# Patient Record
Sex: FEMALE | Race: WHITE | URBAN - METROPOLITAN AREA
[De-identification: names, ages, dates, MRNs, and addresses within clinical notes are randomized per-mention and may not be internally consistent; named-entity substitution may affect disease eponyms.]

---

## 2017-10-12 ENCOUNTER — OFFICE VISIT (OUTPATIENT)
Dept: PEDIATRICS | Facility: CLINIC | Age: 27
End: 2017-10-12
Payer: COMMERCIAL

## 2017-10-12 VITALS
HEIGHT: 64 IN | DIASTOLIC BLOOD PRESSURE: 50 MMHG | HEART RATE: 62 BPM | WEIGHT: 131.3 LBS | BODY MASS INDEX: 22.42 KG/M2 | OXYGEN SATURATION: 98 % | SYSTOLIC BLOOD PRESSURE: 100 MMHG | TEMPERATURE: 97.8 F

## 2017-10-12 DIAGNOSIS — R76.8 ELEVATED ANTINUCLEAR ANTIBODY (ANA) LEVEL: ICD-10-CM

## 2017-10-12 DIAGNOSIS — K31.9 DYSPEPSIA AND DISORDER OF FUNCTION OF STOMACH: ICD-10-CM

## 2017-10-12 DIAGNOSIS — M25.50 MULTIPLE JOINT PAIN: ICD-10-CM

## 2017-10-12 DIAGNOSIS — R79.89 ABNORMAL TSH: Primary | ICD-10-CM

## 2017-10-12 DIAGNOSIS — R10.13 DYSPEPSIA AND DISORDER OF FUNCTION OF STOMACH: ICD-10-CM

## 2017-10-12 LAB
ALBUMIN SERPL-MCNC: 3.7 G/DL (ref 3.4–5)
ALP SERPL-CCNC: 47 U/L (ref 40–150)
ALT SERPL W P-5'-P-CCNC: 27 U/L (ref 0–50)
ANION GAP SERPL CALCULATED.3IONS-SCNC: 4 MMOL/L (ref 3–14)
AST SERPL W P-5'-P-CCNC: 23 U/L (ref 0–45)
BILIRUB SERPL-MCNC: 0.5 MG/DL (ref 0.2–1.3)
BUN SERPL-MCNC: 10 MG/DL (ref 7–30)
CALCIUM SERPL-MCNC: 9.2 MG/DL (ref 8.5–10.1)
CHLORIDE SERPL-SCNC: 106 MMOL/L (ref 94–109)
CK SERPL-CCNC: 130 U/L (ref 30–225)
CO2 SERPL-SCNC: 27 MMOL/L (ref 20–32)
CREAT SERPL-MCNC: 0.89 MG/DL (ref 0.52–1.04)
CRP SERPL-MCNC: 4.2 MG/L (ref 0–8)
ERYTHROCYTE [SEDIMENTATION RATE] IN BLOOD BY WESTERGREN METHOD: 6 MM/H (ref 0–20)
GFR SERPL CREATININE-BSD FRML MDRD: 76 ML/MIN/1.7M2
GLUCOSE SERPL-MCNC: 95 MG/DL (ref 70–99)
POTASSIUM SERPL-SCNC: 3.8 MMOL/L (ref 3.4–5.3)
PROT SERPL-MCNC: 7.4 G/DL (ref 6.8–8.8)
SODIUM SERPL-SCNC: 137 MMOL/L (ref 133–144)
T4 FREE SERPL-MCNC: 1.13 NG/DL (ref 0.76–1.46)
TSH SERPL DL<=0.005 MIU/L-ACNC: 3.66 MU/L (ref 0.4–4)

## 2017-10-12 PROCEDURE — 82550 ASSAY OF CK (CPK): CPT | Performed by: NURSE PRACTITIONER

## 2017-10-12 PROCEDURE — 86140 C-REACTIVE PROTEIN: CPT | Performed by: NURSE PRACTITIONER

## 2017-10-12 PROCEDURE — 85652 RBC SED RATE AUTOMATED: CPT | Performed by: NURSE PRACTITIONER

## 2017-10-12 PROCEDURE — 86038 ANTINUCLEAR ANTIBODIES: CPT | Performed by: NURSE PRACTITIONER

## 2017-10-12 PROCEDURE — 86431 RHEUMATOID FACTOR QUANT: CPT | Performed by: NURSE PRACTITIONER

## 2017-10-12 PROCEDURE — 99203 OFFICE O/P NEW LOW 30 MIN: CPT | Performed by: NURSE PRACTITIONER

## 2017-10-12 PROCEDURE — 86376 MICROSOMAL ANTIBODY EACH: CPT | Performed by: NURSE PRACTITIONER

## 2017-10-12 PROCEDURE — 80053 COMPREHEN METABOLIC PANEL: CPT | Performed by: NURSE PRACTITIONER

## 2017-10-12 PROCEDURE — 36415 COLL VENOUS BLD VENIPUNCTURE: CPT | Performed by: NURSE PRACTITIONER

## 2017-10-12 PROCEDURE — 84443 ASSAY THYROID STIM HORMONE: CPT | Performed by: NURSE PRACTITIONER

## 2017-10-12 PROCEDURE — 86039 ANTINUCLEAR ANTIBODIES (ANA): CPT | Performed by: NURSE PRACTITIONER

## 2017-10-12 PROCEDURE — 84439 ASSAY OF FREE THYROXINE: CPT | Performed by: NURSE PRACTITIONER

## 2017-10-12 RX ORDER — DROSPIRENONE AND ETHINYL ESTRADIOL 0.02-3(28)
1 KIT ORAL DAILY
Refills: 12 | COMMUNITY
Start: 2017-07-23

## 2017-10-12 RX ORDER — LEVALBUTEROL TARTRATE 45 UG/1
1 AEROSOL, METERED ORAL PRN
Refills: 1 | COMMUNITY
Start: 2016-12-21

## 2017-10-12 NOTE — PATIENT INSTRUCTIONS
PLAN:   1.  Orders Placed This Encounter   Medications     GIANVI 3-0.02 MG per tablet     Sig: Take 1 tablet by mouth daily     Refill:  12     XOPENEX HFA 45 MCG/ACT Inhaler     Sig: Inhale 1 puff into the lungs as needed     Refill:  1     Orders Placed This Encounter   Procedures     TSH     T4 free     Thyroid peroxidase antibody     Rheumatoid factor     Anti Nuclear Margo IgG by IFA with Reflex     CK total     CRP inflammation     Erythrocyte sedimentation rate auto     Comprehensive metabolic panel       2. Patient needs to follow up in if no improvement,or sooner if worsening of symptoms or other symptoms develop.  Will follow up and/or notify patient of  results via My Chart to determine further need for followup      It was a pleasure seeing you today at the [facility]. Thank you for allowing us to care for you today. We truly hope we provided you with the excellent service you deserve. Please let us know if there is anything else we can do for you so we can be sure you are leaving completley satisfied with your care experience.       General information about your clinic   Clinic Hours Lab Hours (Appointments are required)   Mon-Thurs: 7:30 AM - 7 PM Mon-Thurs: 7:30 AM - 7 PM   Fri: 7:30 AM - 5 PM Fri: 7:30 AM - 5 PM        After Hours Nurse Advise & Appts:  [facility] Nurse Advisors: [phone]  Carmen On Call: to make appointments anytime: [phone] On Call Physician: call [phone] and answering service will page the on call physician.        For urgent appointments, please call [phone] and ask for the triage nurse or your care team clinic nurse.  How to contact my care team:  MyChart: [email or web address]   Phone: [phone]   Fax: [phone]       [facility]:   Phone: [phone]  Hours: 8:00 AM - 6:00 PM  Medication Refills:  Call your pharmacy and they will forward the refill to us. Please allow 3 business days for your refills to be  completed.       Normal or non-critical lab and imaging results will be communicated to you by MyChart, letter or phone within 7 days.  If you do not hear from us within 10 days, please call the clinic. If you have a critical or abnormal lab result, we will notify you by phone as soon as possible.       We now have PWIC (Pediatric Walk in Care)  Monday-Friday from 7:30-4. Simply walk in and be seen for your urgent needs like cough, fever, rash, diarrhea or vomiting, pink eye, UTI. No appointments needed. Ask one of the team for more information      -Your Care Team:    Dr. Brain Rubalcava - Internal Medicine/Pediatrics   Dr. Tanner Rodríguez - Family Medicine  Dr. Nara gAuilera - Pediatrics  Beatriz Blankenship CNP - Family Practice Nurse Practitioner  Dr. Edie Jacinto - Pediatrics

## 2017-10-12 NOTE — PROGRESS NOTES
SUBJECTIVE:   Louise Cuba is a 27 year old female who presents to clinic today for the following health issues:    New Patient/Transfer of Care    1. Needs a referral for endocrinologist, was seen at [facility] in the past, thyroid labs were elevated. Patient was seeing partner in peds in the past.  Is here on recommendation of [facility] after having workup there for chronic stomach issues.  In August had what was thought to be food poisoning   Had severe diarrhea for week with nausea and abdominal pain  Had one night of fever  Was seen at [facility] first   Is getting [facility] and they are looking at gastric emptying studies but on hold until after looking at thyroid as elevated at [facility] at about 6.94    Problem list and histories reviewed & adjusted, as indicated.  Additional history: as documented    There is no problem list on file for this patient.    History reviewed. No pertinent surgical history.    Social History   Substance Use Topics     Smoking status: Never Smoker     Smokeless tobacco: Never Used     Alcohol use Yes      Comment: socially     Family History   Problem Relation Age of Onset     DIABETES Maternal Grandmother      CEREBROVASCULAR DISEASE Maternal Grandmother      Other Cancer Maternal Grandfather      leukemia     Colon Cancer Paternal Grandmother      Other Cancer Paternal Grandfather      Coronary Artery Disease No family hx of      Hypertension No family hx of      Hyperlipidemia No family hx of      Breast Cancer No family hx of      Prostate Cancer No family hx of      Depression No family hx of      Anxiety Disorder No family hx of      MENTAL ILLNESS No family hx of      Substance Abuse No family hx of      Anesthesia Reaction No family hx of      Asthma No family hx of      OSTEOPOROSIS No family hx of      Genetic Disorder No family hx of      Thyroid Disease No family hx of      Obesity No family hx of      Unknown/Adopted No family hx of          Current Outpatient  "Prescriptions   Medication Sig Dispense Refill     GIANVI 3-0.02 MG per tablet Take 1 tablet by mouth daily  12     XOPENEX HFA 45 MCG/ACT Inhaler Inhale 1 puff into the lungs as needed  1     No Known Allergies  Labs reviewed in EPIC      Reviewed and updated as needed this visit by clinical staffTobacco  Allergies  Meds  Med Hx  Surg Hx  Fam Hx  Soc Hx      Reviewed and updated as needed this visit by Provider         ROS:  CONSTITUTIONAL:NEGATIVE for fever, chills. Some mild weight gain in the last 6 months  RESP:NEGATIVE for significant cough or SOB  CV: NEGATIVE for chest pain, palpitations or peripheral edema  GI: POSITIVE for constipation, diarrhea and dyspepsia and NEGATIVE for nausea, vomiting and weight loss  MUSCULOSKELETAL: POSITIVE  for joint pain. Mother has rheumatoid arthritis. Does have some morning stiffness. Joints that usually bother her are her hands and knees. Sometimes her wrists will hurt periodically   and NEGATIVE for joint swelling  and joint warmth   ENDOCRINE: POSITIVE  for weight gain and abnormal TSH  and NEGATIVE for polydipsia, polyphagia and polyuria  PSYCHIATRIC: POSITIVE for some mild anxiety but never had to be treated  and NEGATIVE forthoughts of hurting someone else and thoughts of self harm    OBJECTIVE:     /50 (BP Location: Right arm, Patient Position: Sitting, Cuff Size: Adult Regular)  Pulse 62  Temp 97.8  F (36.6  C) (Temporal)  Ht 5' 3.75\" (1.619 m)  Wt 131 lb 4.8 oz (59.6 kg)  LMP  (LMP Unknown)  SpO2 98%  Breastfeeding? No  BMI 22.71 kg/m2  Body mass index is 22.71 kg/(m^2).  GENERAL: healthy, alert and no distress  HENT: ear canals and TM's normal, nose and mouth without ulcers or lesions  NECK: no adenopathy and thyroid normal to palpation  RESP: lungs clear to auscultation - no rales, rhonchi or wheezes  CV: regular rates and rhythm, no murmur, click or rub, peripheral pulses strong and no peripheral edema  MS: no gross musculoskeletal defects " noted, no edema  NEURO: Normal strength and tone, mentation intact and speech normal  PSYCH: mentation appears normal, affect normal/bright    Diagnostic Test Results:  Results for orders placed or performed in visit on 10/12/17   TSH   Result Value Ref Range    TSH 3.66 0.40 - 4.00 mU/L   T4 free   Result Value Ref Range    T4 Free 1.13 0.76 - 1.46 ng/dL   Thyroid peroxidase antibody   Result Value Ref Range    Thyroid Peroxidase Antibody <10 <35 IU/mL   Rheumatoid factor   Result Value Ref Range    Rheumatoid Factor <20 <20 IU/mL   Anti Nuclear Margo IgG by IFA with Reflex   Result Value Ref Range    CHI interpretation Borderline Positive (A) NEG^Negative    CHI pattern 1 SPECKLED     CHI titer 1 1:80    CK total   Result Value Ref Range    CK Total 130 30 - 225 U/L   CRP inflammation   Result Value Ref Range    CRP Inflammation 4.2 0.0 - 8.0 mg/L   Erythrocyte sedimentation rate auto   Result Value Ref Range    Sed Rate 6 0 - 20 mm/h   Comprehensive metabolic panel   Result Value Ref Range    Sodium 137 133 - 144 mmol/L    Potassium 3.8 3.4 - 5.3 mmol/L    Chloride 106 94 - 109 mmol/L    Carbon Dioxide 27 20 - 32 mmol/L    Anion Gap 4 3 - 14 mmol/L    Glucose 95 70 - 99 mg/dL    Urea Nitrogen 10 7 - 30 mg/dL    Creatinine 0.89 0.52 - 1.04 mg/dL    GFR Estimate 76 >60 mL/min/1.7m2    GFR Estimate If Black >90 >60 mL/min/1.7m2    Calcium 9.2 8.5 - 10.1 mg/dL    Bilirubin Total 0.5 0.2 - 1.3 mg/dL    Albumin 3.7 3.4 - 5.0 g/dL    Protein Total 7.4 6.8 - 8.8 g/dL    Alkaline Phosphatase 47 40 - 150 U/L    ALT 27 0 - 50 U/L    AST 23 0 - 45 U/L       ASSESSMENT/PLAN:       Louise was seen today for thyroid disease.    Diagnoses and all orders for this visit:    Abnormal TSH  -     TSH  -     T4 free  -     Thyroid peroxidase antibody    Multiple joint pain  -     Rheumatoid factor  -     Anti Nuclear Margo IgG by IFA with Reflex  -     CK total  -     CRP inflammation  -     Erythrocyte sedimentation rate auto  -      RHEUMATOLOGY REFERRAL    Dyspepsia and disorder of function of stomach  -     Comprehensive metabolic panel    Elevated antinuclear antibody (CHI) level  -     RHEUMATOLOGY REFERRAL    PLAN:    Patient needs to follow up in if no improvement,or sooner if worsening of symptoms or other symptoms develop.  Will follow up and/or notify patient of  results via My Chart to determine further need for followup        See Patient Instructions    ANGIE Lilly [facility]

## 2017-10-12 NOTE — PROGRESS NOTES
Chris Cuba,    Attached are your test results.  -Liver and gallbladder tests are normal. (ALT,AST, Alk phos, bilirubin), kidney function is normal (Cr, GFR), Sodium is normal, Potassium is normal, Calcium is normal, Glucose is normal (diabetes screening test).   -TSH (thyroid stimulating hormone) level is normal which indicates normal thyroid function.  However antibodies are still pending   Your inflammatory markers are normal so far    Please contact us if you have any questions.    Beatriz Blankenship, CNP

## 2017-10-12 NOTE — MR AVS SNAPSHOT
After Visit Summary   10/12/2017    Louise Cuba    MRN: [id number]           Patient Information     Date Of Birth          1990        Visit Information        Provider Department      10/12/2017 11:30 AM Beatriz Blankenship APRN CNP [facility]        Today's Diagnoses     Abnormal TSH    -  1    Multiple joint pain        Dyspepsia and disorder of function of stomach          Care Instructions    PLAN:   1.  Orders Placed This Encounter   Medications     GIANVI 3-0.02 MG per tablet     Sig: Take 1 tablet by mouth daily     Refill:  12     XOPENEX HFA 45 MCG/ACT Inhaler     Sig: Inhale 1 puff into the lungs as needed     Refill:  1     Orders Placed This Encounter   Procedures     TSH     T4 free     Thyroid peroxidase antibody     Rheumatoid factor     Anti Nuclear Margo IgG by IFA with Reflex     CK total     CRP inflammation     Erythrocyte sedimentation rate auto     Comprehensive metabolic panel       2. Patient needs to follow up in if no improvement,or sooner if worsening of symptoms or other symptoms develop.  Will follow up and/or notify patient of  results via My Chart to determine further need for followup      It was a pleasure seeing you today at the [facility]. Thank you for allowing us to care for you today. We truly hope we provided you with the excellent service you deserve. Please let us know if there is anything else we can do for you so we can be sure you are leaving completley satisfied with your care experience.       General information about your clinic   Clinic Hours Lab Hours (Appointments are required)   Mon-Thurs: 7:30 AM - 7 PM Mon-Thurs: 7:30 AM - 7 PM   Fri: 7:30 AM - 5 PM Fri: 7:30 AM - 5 PM        After Hours Nurse Advise & Appts:  [facility] Nurse Advisors: [phone]  Carmen On Call: to make appointments anytime: [phone] On Call Physician: call [phone] and answering service will page the on call  physician.        For urgent appointments, please call [phone] and ask for the triage nurse or your care team clinic nurse.  How to contact my care team:  Francesca: [email or web address]   Phone: [phone]   Fax: [phone]       [facility]:   Phone: [phone]  Hours: 8:00 AM - 6:00 PM  Medication Refills:  Call your pharmacy and they will forward the refill to us. Please allow 3 business days for your refills to be completed.       Normal or non-critical lab and imaging results will be communicated to you by MyChart, letter or phone within 7 days.  If you do not hear from us within 10 days, please call the clinic. If you have a critical or abnormal lab result, we will notify you by phone as soon as possible.       We now have PWIC (Pediatric Walk in Care)  Monday-Friday from 7:30-4. Simply walk in and be seen for your urgent needs like cough, fever, rash, diarrhea or vomiting, pink eye, UTI. No appointments needed. Ask one of the team for more information      -Your Care Team:    Dr. Brain Rubalcava - Internal Medicine/Pediatrics   Dr. Tanner Rodríguez - Family Medicine  Dr. Nara Aguilera - Pediatrics  Beatriz Blankenship CNP - Family Practice Nurse Practitioner  Dr. Edie Jacinto - Pediatrics                       Follow-ups after your visit        Who to contact     If you have questions or need follow up information about today's clinic visit or your schedule please contact [facility] directly at [phone].  Normal or non-critical lab and imaging results will be communicated to you by [employer], letter or phone within 4 business days after the clinic has received the results. If you do not hear from us within 7 days, please contact the clinic through Ener[employer] or phone. If you have a critical or abnormal lab result, we will notify you by phone as soon as possible.  Submit refill requests through [employer] or call your pharmacy and they will forward the refill request to us. Please allow 3  "business days for your refill to be completed.          Additional Information About Your Visit        A[employer]P [employer] Information     [employer] is an electronic gateway that provides easy, online access to your medical records. With [employer], you can request a clinic appointment, read your test results, renew a prescription or communicate with your care team.     To sign up for [employer] visit the website at [email or web address]   You will be asked to enter the access code listed below, as well as some personal information. Please follow the directions to create your username and password.     Your access code is: [email or web address]  Expires: 1/10/2018 12:22 PM     Your access code will  in 90 days. If you need help or a new code, please contact your [facility] or call [phone] for assistance.        Care EveryWhere ID     This is your Care EveryWhere ID. This could be used by other organizations to access your [facility] medical records  [id number]        Your Vitals Were     Pulse Temperature Height Last Period Pulse Oximetry Breastfeeding?    62 97.8  F (36.6  C) (Temporal) 5' 3.75\" (1.619 m) (LMP Unknown) 98% No    BMI (Body Mass Index)                   22.71 kg/m2            Blood Pressure from Last 3 Encounters:   10/12/17 100/50    Weight from Last 3 Encounters:   10/12/17 131 lb 4.8 oz (59.6 kg)              We Performed the Following     Anti Nuclear Margo IgG by IFA with Reflex     CK total     Comprehensive metabolic panel     CRP inflammation     Erythrocyte sedimentation rate auto     Rheumatoid factor     T4 free     Thyroid peroxidase antibody     TSH        Primary Care Provider    None Specified       No primary provider on file.        Equal Access to Services     FRANCISCA STEPHENS : Paulino Salazar, kristen justice, elmer sharma. So [facility] [phone].    ATENCIÓN: Si habla español, tiene a yin disposición " servicios gratuitos de asistencia lingüística. Rosemary velazco [phone].    We comply with applicable federal civil rights laws and [place] laws. We do not discriminate on the basis of race, color, national origin, age, disability, sex, sexual orientation, or gender identity.            Thank you!     Thank you for choosing [facility]  for your care. Our goal is always to provide you with excellent care. Hearing back from our patients is one way we can continue to improve our services. Please take a few minutes to complete the written survey that you may receive in the mail after your visit with us. Thank you!             Your Updated Medication List - Protect others around you: Learn how to safely use, store and throw away your medicines at www.disposemymeds.org.          This list is accurate as of: 10/12/17 12:23 PM.  Always use your most recent med list.                   Brand Name Dispense Instructions for use Diagnosis    GIANVI 3-0.02 MG per tablet   Generic drug:  drospirenone-ethinyl estradiol      Take 1 tablet by mouth daily        XOPENEX HFA 45 MCG/ACT Inhaler   Generic drug:  levalbuterol      Inhale 1 puff into the lungs as needed

## 2017-10-12 NOTE — NURSING NOTE
"Chief Complaint   Patient presents with     Thyroid Disease     needs a referral for endocrinologist, was seen at [facility] in the past, thyroid labs were elevated        Initial /50 (BP Location: Right arm, Patient Position: Sitting, Cuff Size: Adult Regular)  Pulse 62  Temp 97.8  F (36.6  C) (Temporal)  Ht 5' 3.75\" (1.619 m)  Wt 131 lb 4.8 oz (59.6 kg)  LMP  (LMP Unknown)  SpO2 98%  Breastfeeding? No  BMI 22.71 kg/m2 Estimated body mass index is 22.71 kg/(m^2) as calculated from the following:    Height as of this encounter: 5' 3.75\" (1.619 m).    Weight as of this encounter: 131 lb 4.8 oz (59.6 kg).  Medication Reconciliation: complete      JAGDISH De La Torre      "

## 2017-10-13 LAB
ANA PAT SER IF-IMP: ABNORMAL
ANA SER QL IF: ABNORMAL
ANA TITR SER IF: ABNORMAL {TITER}
RHEUMATOID FACT SER NEPH-ACNC: <20 IU/ML (ref 0–20)
THYROPEROXIDASE AB SERPL-ACNC: <10 IU/ML

## 2017-10-15 PROBLEM — R76.8 ELEVATED ANTINUCLEAR ANTIBODY (ANA) LEVEL: Status: ACTIVE | Noted: 2017-10-15

## 2017-10-15 NOTE — PROGRESS NOTES
Chris Cuba,    Attached are your test results  Lupus screen is borderline elevated   Will refer to rheumatology and see what they think     Please call [phone] to make appointment  if you do not hear from referrals in the next few days.      Please contact us if you have any questions.    Beatriz Blankenship, CNP

## 2017-10-18 ENCOUNTER — PRE VISIT (OUTPATIENT)
Dept: RHEUMATOLOGY | Facility: CLINIC | Age: 27
End: 2017-10-18

## 2017-10-18 NOTE — TELEPHONE ENCOUNTER
PREVISIT INFORMATION                                                    Louise Cuba scheduled for future visit at [facility] specialty clinics.    Patient is scheduled to see Dr Martino on 10/23/2017  Reason for visit:   Elevated antinuclear antibody (CHI) level [R76.8]       Multiple joint pain [M25.50]           Referring provider Beatriz Blankenship APRN CNP  Has patient seen previous specialist? No  Medical Records:  All records are in the chart    REVIEW                                                      New patient packet mailed to patient: N/A  Medication reconciliation complete: N/A      Current Outpatient Prescriptions   Medication Sig Dispense Refill     GIANVI 3-0.02 MG per tablet Take 1 tablet by mouth daily  12     XOPENEX HFA 45 MCG/ACT Inhaler Inhale 1 puff into the lungs as needed  1       Allergies: Review of patient's allergies indicates no known allergies.        PLAN/FOLLOW-UP NEEDED                                                      Will route to care team for follow-up.    Patient Reminders Given:  Please, make sure you bring an updated list of your medications.   If you are having a procedure, please, present 15 minutes early.  If you need to cancel or reschedule,please call [phone].    Nupur MOE CMA

## 2017-10-18 NOTE — TELEPHONE ENCOUNTER
Called patient and message left for her to call back for Previsit intake for upcoming appt with Dr Martino Mon 10/23/17.

## 2017-10-23 ENCOUNTER — OFFICE VISIT (OUTPATIENT)
Dept: RHEUMATOLOGY | Facility: CLINIC | Age: 27
End: 2017-10-23
Attending: NURSE PRACTITIONER
Payer: COMMERCIAL

## 2017-10-23 VITALS
WEIGHT: 131 LBS | SYSTOLIC BLOOD PRESSURE: 110 MMHG | HEART RATE: 55 BPM | BODY MASS INDEX: 22.36 KG/M2 | DIASTOLIC BLOOD PRESSURE: 66 MMHG | TEMPERATURE: 97.3 F | HEIGHT: 64 IN

## 2017-10-23 DIAGNOSIS — R76.8 POSITIVE ANA (ANTINUCLEAR ANTIBODY): Primary | ICD-10-CM

## 2017-10-23 PROCEDURE — 86225 DNA ANTIBODY NATIVE: CPT | Performed by: STUDENT IN AN ORGANIZED HEALTH CARE EDUCATION/TRAINING PROGRAM

## 2017-10-23 PROCEDURE — 86235 NUCLEAR ANTIGEN ANTIBODY: CPT | Performed by: STUDENT IN AN ORGANIZED HEALTH CARE EDUCATION/TRAINING PROGRAM

## 2017-10-23 PROCEDURE — 86160 COMPLEMENT ANTIGEN: CPT | Performed by: STUDENT IN AN ORGANIZED HEALTH CARE EDUCATION/TRAINING PROGRAM

## 2017-10-23 PROCEDURE — 99204 OFFICE O/P NEW MOD 45 MIN: CPT | Performed by: STUDENT IN AN ORGANIZED HEALTH CARE EDUCATION/TRAINING PROGRAM

## 2017-10-23 PROCEDURE — 86200 CCP ANTIBODY: CPT | Performed by: STUDENT IN AN ORGANIZED HEALTH CARE EDUCATION/TRAINING PROGRAM

## 2017-10-23 PROCEDURE — 36415 COLL VENOUS BLD VENIPUNCTURE: CPT | Performed by: STUDENT IN AN ORGANIZED HEALTH CARE EDUCATION/TRAINING PROGRAM

## 2017-10-23 NOTE — LETTER
November 6, 2017      Louise [place]  [address] [address]        Dear ,    We are writing to inform you of your test results.    Normal Blood tests.    Resulted Orders   Complement C3   Result Value Ref Range    Complement C3 76 76 - 169 mg/dL   Complement C4   Result Value Ref Range    Complement C4 15 15 - 50 mg/dL   DNA double stranded antibodies   Result Value Ref Range    DNA-ds <1 <10 IU/mL      Comment:      Negative   HAKEEM Panel (RNP, SMITH, Scleroderma, SSAB, SSBB); HAKEEM, Antibodies, Panel   Result Value Ref Range    RNP Antibody IgG 0.2 0.0 - 0.9 AI      Comment:      Negative  Antibody index (AI) values reflect qualitative changes in antibody   concentration that cannot be directly associated with clinical condition or   disease state.      Blankenship HAKEEM Antibody IgG <0.2 0.0 - 0.9 AI      Comment:      Negative  Antibody index (AI) values reflect qualitative changes in antibody   concentration that cannot be directly associated with clinical condition or   disease state.      SSA (Ro) (HAKEEM) Antibody, IgG <0.2 0.0 - 0.9 AI      Comment:      Negative  Antibody index (AI) values reflect qualitative changes in antibody   concentration that cannot be directly associated with clinical condition or   disease state.      SSB (La) (HAKEEM) Antibody, IgG <0.2 0.0 - 0.9 AI      Comment:      Negative  Antibody index (AI) values reflect qualitative changes in antibody   concentration that cannot be directly associated with clinical condition or   disease state.      Scleroderma Antibody Scl-70 HAKEEM IgG <0.2 0.0 - 0.9 AI      Comment:      Negative  Antibody index (AI) values reflect qualitative changes in antibody   concentration that cannot be directly associated with clinical condition or   disease state.     Centromere Antibody IgG   Result Value Ref Range    Centromere Antibody IgG <0.2 0.0 - 0.9 AI      Comment:      Negative  Antibody index (AI) values reflect qualitative changes in  antibody   concentration that cannot be directly associated with clinical condition or   disease state.     Cyclic Citrullinated Peptide Antibody IgG   Result Value Ref Range    Cyclic Citrullinated Peptide Antibody, IgG 1 <7 U/mL      Comment:      Negative       If you have any questions or concerns, please call the clinic at the number listed above.       Sincerely,    Sonja Martino MD

## 2017-10-23 NOTE — LETTER
10/23/2017      RE: Louise Cuba  [address] [facility] [address]     Dear Colleague,    Thank you for referring your patient, Louise Cuba, to the [facility]. Please see a copy of my visit note below.    Rheumatology Clinic Visit     Louise Cuba MRN# [id number]   YOB: 1990 Age: 27 year old     Date of Visit: October 23, 2017  Primary care provider: Beatriz Blankenship          Assessment and Plan:   Assessment     -- Chronic diarrhea   -- Abdominal bloating   -- Positive CHI   -- Family history of rheumatoid arthritis     Ms Cuba is a 27-year-old female seen in clinic for evaluation of positive CHI. She has chronic diarrhea, abdominal bloating for many years which she thinks is related to lactose intolerance.     She was referred to rheumatology clinic because of positive CHI of 1: 80 speckled. At present she does not have significant symptoms suggestive of connective tissue disease. Her physical exam is benign.     She does give history of photosensitivity and occasional joint pains involving her hands, knees and hips. Therefore it is reasonable to check for rheumatoid serologies and other specific serologies for connective tissue diseases.     Inflammatory arthritis and connective tissue diseases like lupus seems very unlikely. Her GI problems might be related to irritable bowel syndrome or lactose intolerance.    Plan    -- Blood tests- HAKEEM panel, anti-CCP antibody, complement levels, double-stranded DNA, centromere antibody. She had normal inflammatory markers, rheumatoid factor, CK.    -- I will review the lab results and will see her back based on the blood tests. RTC on as needed basis.                 Active Problem List:     Patient Active Problem List    Diagnosis Date Noted     Elevated antinuclear antibody (CHI) level 10/15/2017     Priority: Medium            History of Present Illness:   Louise Cuba is a 27 year old female with no  significant PMH seen in the clinic in consultation at request of Beatriz Blankenship for evaluation of positive CHI.     For years she has stomach upset, irregular stools, bloating but this summer had food poisoning and diarrhea for 4 weeks straight which prompted her to get GI evaluation. On routine blood work done by YAYO FAUSTIN her TSH was found to be elevated at 6.9 and was referred to see primary care provider for further evaluation of thyroid disorder. On repeat testing TSH was normal but CHI was found to be elevated which prompted rheumatology evaluation for possible connective tissue disease.     She complains of joint pains in her fingers with swelling often and feels tight. 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 Also use inhaler with working out. She has generalized morning stiffness for 30 mins.   For the last couple of years she gets hives like skin rash in the presence of sun. (Denies any raynauds, malar rash, recurrent mouth/genital ulcers, sicca symptoms, pleuritic chest pains, recurrent sinusitis/rhinitis, swallowing difficulty, hearing or visual changes recently. No h/o arterial/venous thrombosis in the past. Denies any tick bite, recent GI/ infection. No history of psoriasis, ulcerative colitis or chron's disease. No h/o iritis, enthesitis, finger or toe swelling like dactylitis, plantar fascitis or heel pain.     Mother has RA.          Review of Systems:   Review Of Systems  Constitutional: denies fever, chills, night sweats and weight loss.  Skin: No skin rash.  Eyes: No dryness or irritation in eyes. No episode of eye inflammation or redness.   Ears/Nose/Throat: no recurrent sinus infections.  Respiratory: No shortness of breath, dyspnea on exertion, cough, or hemoptysis  Cardiovascular: no chest pain or palpitations.  Gastrointestinal: no nausea, vomiting,+ abdominal pain, chronic diarrhea.   .Genitourinary: no dysuria, frequency  or  hematuria.  Musculoskeletal: as in HPI  Neurologic: no numbness, tingling.  Psychiatric: no mood disorders.  Hematologic/Lymphatic/Immunologic: no history of easy bruising, petechia or purpura.  No abnormal bleeding.   Endocrine: no h/o thyroid disease or Diabetes.                  Past Medical History:     Past Medical History:   Diagnosis Date     Diarrhea      Positive CHI (antinuclear antibody)      Past Surgical History:   Procedure Laterality Date     NO HISTORY OF SURGERY              Social History:     Social History     Occupational History     Not on file.     Social History Main Topics     Smoking status: Never Smoker     Smokeless tobacco: Never Used     Alcohol use Yes      Comment: socially     Drug use: No     Sexual activity: Not Currently     Partners: Male     Birth control/ protection: Pill            Family History:     Family History   Problem Relation Age of Onset     DIABETES Maternal Grandmother      CEREBROVASCULAR DISEASE Maternal Grandmother      Other Cancer Maternal Grandfather      leukemia     Colon Cancer Paternal Grandmother      Other Cancer Paternal Grandfather      Coronary Artery Disease No family hx of      Hypertension No family hx of      Hyperlipidemia No family hx of      Breast Cancer No family hx of      Prostate Cancer No family hx of      Depression No family hx of      Anxiety Disorder No family hx of      MENTAL ILLNESS No family hx of      Substance Abuse No family hx of      Anesthesia Reaction No family hx of      Asthma No family hx of      OSTEOPOROSIS No family hx of      Genetic Disorder No family hx of      Thyroid Disease No family hx of      Obesity No family hx of      Unknown/Adopted No family hx of             Allergies:   No Known Allergies         Medications:     Current Outpatient Prescriptions   Medication Sig Dispense Refill     GIANVI 3-0.02 MG per tablet Take 1 tablet by mouth daily  12     XOPENEX HFA 45 MCG/ACT Inhaler Inhale 1 puff into the  "lungs as needed  1            Physical Exam:   Blood pressure 110/66, pulse 55, temperature 97.3  F (36.3  C), height 1.619 m (5' 3.75\"), weight 59.4 kg (131 lb), not currently breastfeeding.  Wt Readings from Last 4 Encounters:   10/23/17 59.4 kg (131 lb)   10/12/17 59.6 kg (131 lb 4.8 oz)       Constitutional: well-developed, appearing stated age; cooperative  Eyes: nl EOM, PERRLA, vision, conjunctiva, sclera  ENT: nl external ears, nose, hearing, lips, teeth, gums, throat  No mucous membrane lesions, normal saliva pool  Neck: no mass or thyroid enlargement  Resp: lungs clear to auscultation, nl to palpation  CV: RRR, no murmurs, rubs or gallops, no edema  GI: no ABD mass or tenderness, no HSM  : not tested  Lymph: no cervical, supraclavicular, inguinal or epitrochlear nodes    MS: All TMJ, neck, shoulder, elbow, wrist, MCP/PIP/DIP, spine, hip, knee, ankle, and foot MTP/IP joints were examined.   -- No active synovitis or deformity. Full ROM.  Normal  strength.   -- No dactylitis,  tenosynovitis, enthesopathy.    Skin: no nail pitting, alopecia, rash, nodules or lesions  Neuro: nl cranial nerves, strength, sensation, DTRs.   Psych: nl judgement, orientation, memory, affect.         Data:     Results for orders placed or performed in visit on 10/12/17   TSH   Result Value Ref Range    TSH 3.66 0.40 - 4.00 mU/L   T4 free   Result Value Ref Range    T4 Free 1.13 0.76 - 1.46 ng/dL   Thyroid peroxidase antibody   Result Value Ref Range    Thyroid Peroxidase Antibody <10 <35 IU/mL   Rheumatoid factor   Result Value Ref Range    Rheumatoid Factor <20 <20 IU/mL   Anti Nuclear Margo IgG by IFA with Reflex   Result Value Ref Range    CHI interpretation Borderline Positive (A) NEG^Negative    CHI pattern 1 SPECKLED     CHI titer 1 1:80    CK total   Result Value Ref Range    CK Total 130 30 - 225 U/L   CRP inflammation   Result Value Ref Range    CRP Inflammation 4.2 0.0 - 8.0 mg/L   Erythrocyte sedimentation rate auto "   Result Value Ref Range    Sed Rate 6 0 - 20 mm/h   Comprehensive metabolic panel   Result Value Ref Range    Sodium 137 133 - 144 mmol/L    Potassium 3.8 3.4 - 5.3 mmol/L    Chloride 106 94 - 109 mmol/L    Carbon Dioxide 27 20 - 32 mmol/L    Anion Gap 4 3 - 14 mmol/L    Glucose 95 70 - 99 mg/dL    Urea Nitrogen 10 7 - 30 mg/dL    Creatinine 0.89 0.52 - 1.04 mg/dL    GFR Estimate 76 >60 mL/min/1.7m2    GFR Estimate If Black >90 >60 mL/min/1.7m2    Calcium 9.2 8.5 - 10.1 mg/dL    Bilirubin Total 0.5 0.2 - 1.3 mg/dL    Albumin 3.7 3.4 - 5.0 g/dL    Protein Total 7.4 6.8 - 8.8 g/dL    Alkaline Phosphatase 47 40 - 150 U/L    ALT 27 0 - 50 U/L    AST 23 0 - 45 U/L       Recent Labs   Lab Test  10/12/17   1230   ALBUMIN  3.7   CRP  4.2   BUN  10      Recent Labs   Lab Test  10/12/17   1230   TSH  3.66   T4  1.13     Urea Nitrogen   Date Value Ref Range Status   10/12/2017 10 7 - 30 mg/dL Final     Sed Rate   Date Value Ref Range Status   10/12/2017 6 0 - 20 mm/h Final     CRP Inflammation   Date Value Ref Range Status   10/12/2017 4.2 0.0 - 8.0 mg/L Final     AST   Date Value Ref Range Status   10/12/2017 23 0 - 45 U/L Final     Albumin   Date Value Ref Range Status   10/12/2017 3.7 3.4 - 5.0 g/dL Final     Alkaline Phosphatase   Date Value Ref Range Status   10/12/2017 47 40 - 150 U/L Final     ALT   Date Value Ref Range Status   10/12/2017 27 0 - 50 U/L Final     Rheumatoid Factor   Date Value Ref Range Status   10/12/2017 <20 <20 IU/mL Final     Recent Labs   Lab Test  10/12/17   1230   BUN  10   TSH  3.66   AST  23   ALT  27   ALKPHOS  47       Outside studies reviewed: Notes from [facility] reviewed     Reviewed Rheumatology lab flowsheet's     Sonja Martino MD  [facility]   Pager - [phone]      Again, thank you for allowing me to participate in the care of your patient.      Sincerely,    Sonja Martino MD

## 2017-10-23 NOTE — MR AVS SNAPSHOT
After Visit Summary   10/23/2017    Louise Cuba    MRN: [id number]           Patient Information     Date Of Birth          1990        Visit Information        Provider Department      10/23/2017 8:00 AM Sonja Martino MD [facility]        Today's Diagnoses     Positive CHI (antinuclear antibody)    -  1      Care Instructions    -- Will do blood tests     -- Connective tissue disease seems very unlikely.     -- RTC on as needed basis           Follow-ups after your visit        Who to contact     If you have questions or need follow up information about today's clinic visit or your schedule please contact [facility] directly at [phone].  Normal or non-critical lab and imaging results will be communicated to you by MyChart, letter or phone within 4 business days after the clinic has received the results. If you do not hear from us within 7 days, please contact the clinic through [employer] or phone. If you have a critical or abnormal lab result, we will notify you by phone as soon as possible.  Submit refill requests through [employer] or call your pharmacy and they will forward the refill request to us. Please allow 3 business days for your refill to be completed.          Additional Information About Your Visit        MyChart Information     [employer] gives you secure access to your electronic health record. If you see a primary care provider, you can also send messages to your care team and make appointments. If you have questions, please call your primary care clinic.  If you do not have a primary care provider, please call [phone] and they will assist you.      [employer] is an electronic gateway that provides easy, online access to your medical records. With [employer], you can request a clinic appointment, read your test results, renew a prescription or communicate with your care team.     To access your existing account, please contact your  "[facility] or call [phone] for assistance.        Care EveryWhere ID     This is your Care EveryWhere ID. This could be used by other organizations to access your [facility] medical records  [id number]        Your Vitals Were     Pulse Temperature Height Last Period BMI (Body Mass Index)       55 97.3  F (36.3  C) 1.619 m (5' 3.75\") (LMP Unknown) 22.66 kg/m2        Blood Pressure from Last 3 Encounters:   10/23/17 110/66   10/12/17 100/50    Weight from Last 3 Encounters:   10/23/17 59.4 kg (131 lb)   10/12/17 59.6 kg (131 lb 4.8 oz)              We Performed the Following     Centromere Antibody IgG     Complement C3     Complement C4     Cyclic Citrullinated Peptide Antibody IgG     DNA double stranded antibodies     HAKEEM Panel (RNP, SMITH, Scleroderma, SSAB, SSBB); HAKEEM, Antibodies, Panel        Primary Care Provider Office Phone # Fax #    Beatriz Michael Krzysztof, APRN [facility] [phone]       [address]  [place] [address]        Equal Access to Services     ZEKE STEPHENS : Hadii aad ku hadasho Sotaliali, waaxda luqadaha, qaybta kaalmada claudette, elmer huffman . So [facility] [phone].    ATENCIÓN: Si habla español, tiene a yin disposición servicios gratuitos de asistencia lingüística. [facility] [phone].    We comply with applicable federal civil rights laws and [place] laws. We do not discriminate on the basis of race, color, national origin, age, disability, sex, sexual orientation, or gender identity.            Thank you!     Thank you for choosing [facility]  for your care. Our goal is always to provide you with excellent care. Hearing back from our patients is one way we can continue to improve our services. Please take a few minutes to complete the written survey that you may receive in the mail after your visit with us. Thank you!             Your Updated Medication List - Protect others around you: " Learn how to safely use, store and throw away your medicines at www.disposemymeds.org.          This list is accurate as of: 10/23/17  8:25 AM.  Always use your most recent med list.                   Brand Name Dispense Instructions for use Diagnosis    GIANVI 3-0.02 MG per tablet   Generic drug:  drospirenone-ethinyl estradiol      Take 1 tablet by mouth daily        XOPENEX HFA 45 MCG/ACT Inhaler   Generic drug:  levalbuterol      Inhale 1 puff into the lungs as needed

## 2017-10-23 NOTE — NURSING NOTE
"Louise Cuba's goals for this visit include:   She requests these members of her care team be copied on today's visit information:     PCP: Beatriz Blankenship    Referring Provider:  Beatriz Blankenship, APRN CNP  [address]  [place] [address]    Chief Complaint   Patient presents with     Consult     Elev CHI       Initial /66 (Patient Position: Sitting, Cuff Size: Adult Regular)  Pulse 55  Temp 97.3  F (36.3  C)  Ht 1.619 m (5' 3.75\")  Wt 59.4 kg (131 lb)  LMP  (LMP Unknown)  BMI 22.66 kg/m2 Estimated body mass index is 22.66 kg/(m^2) as calculated from the following:    Height as of this encounter: 1.619 m (5' 3.75\").    Weight as of this encounter: 59.4 kg (131 lb).  Medication Reconciliation: complete    Do you need any medication refills at today's visit? No    Chief Complaint   Patient presents with     Consult     Elev CHI         "

## 2017-10-23 NOTE — PROGRESS NOTES
Rheumatology Clinic Visit     Louise Cuba MRN# [id number]   YOB: 1990 Age: 27 year old     Date of Visit: October 23, 2017  Primary care provider: Beatriz Blankenship          Assessment and Plan:   Assessment     -- Chronic diarrhea   -- Abdominal bloating   -- Positive CHI   -- Family history of rheumatoid arthritis     Ms Cuba is a 27-year-old female seen in clinic for evaluation of positive CHI. She has chronic diarrhea, abdominal bloating for many years which she thinks is related to lactose intolerance.     She was referred to rheumatology clinic because of positive CHI of 1: 80 speckled. At present she does not have significant symptoms suggestive of connective tissue disease. Her physical exam is benign.     She does give history of photosensitivity and occasional joint pains involving her hands, knees and hips. Therefore it is reasonable to check for rheumatoid serologies and other specific serologies for connective tissue diseases.     Inflammatory arthritis and connective tissue diseases like lupus seems very unlikely. Her GI problems might be related to irritable bowel syndrome or lactose intolerance.    Plan    -- Blood tests- HAKEEM panel, anti-CCP antibody, complement levels, double-stranded DNA, centromere antibody. She had normal inflammatory markers, rheumatoid factor, CK.    -- I will review the lab results and will see her back based on the blood tests. RTC on as needed basis.                 Active Problem List:     Patient Active Problem List    Diagnosis Date Noted     Elevated antinuclear antibody (CHI) level 10/15/2017     Priority: Medium            History of Present Illness:   Louise Cuba is a 27 year old female with no significant PMH seen in the clinic in consultation at request of Beatriz Blankenship for evaluation of positive CHI.     For years she has stomach upset, irregular stools, bloating but this summer had food poisoning and diarrhea for 4 weeks straight  which prompted her to get GI evaluation. On routine blood work done by YAYO FAUSTIN her TSH was found to be elevated at 6.9 and was referred to see primary care provider for further evaluation of thyroid disorder. On repeat testing TSH was normal but CHI was found to be elevated which prompted rheumatology evaluation for possible connective tissue disease.     She complains of joint pains in her fingers with swelling often and feels tight. 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 Also use inhaler with working out. She has generalized morning stiffness for 30 mins.   For the last couple of years she gets hives like skin rash in the presence of sun. (Denies any raynauds, malar rash, recurrent mouth/genital ulcers, sicca symptoms, pleuritic chest pains, recurrent sinusitis/rhinitis, swallowing difficulty, hearing or visual changes recently. No h/o arterial/venous thrombosis in the past. Denies any tick bite, recent GI/ infection. No history of psoriasis, ulcerative colitis or chron's disease. No h/o iritis, enthesitis, finger or toe swelling like dactylitis, plantar fascitis or heel pain.     Mother has RA.          Review of Systems:   Review Of Systems  Constitutional: denies fever, chills, night sweats and weight loss.  Skin: No skin rash.  Eyes: No dryness or irritation in eyes. No episode of eye inflammation or redness.   Ears/Nose/Throat: no recurrent sinus infections.  Respiratory: No shortness of breath, dyspnea on exertion, cough, or hemoptysis  Cardiovascular: no chest pain or palpitations.  Gastrointestinal: no nausea, vomiting,+ abdominal pain, chronic diarrhea.   .Genitourinary: no dysuria, frequency  or hematuria.  Musculoskeletal: as in HPI  Neurologic: no numbness, tingling.  Psychiatric: no mood disorders.  Hematologic/Lymphatic/Immunologic: no history of easy bruising, petechia or purpura.  No abnormal bleeding.   Endocrine: no h/o thyroid disease or  "Diabetes.                  Past Medical History:     Past Medical History:   Diagnosis Date     Diarrhea      Positive CHI (antinuclear antibody)      Past Surgical History:   Procedure Laterality Date     NO HISTORY OF SURGERY              Social History:     Social History     Occupational History     Not on file.     Social History Main Topics     Smoking status: Never Smoker     Smokeless tobacco: Never Used     Alcohol use Yes      Comment: socially     Drug use: No     Sexual activity: Not Currently     Partners: Male     Birth control/ protection: Pill            Family History:     Family History   Problem Relation Age of Onset     DIABETES Maternal Grandmother      CEREBROVASCULAR DISEASE Maternal Grandmother      Other Cancer Maternal Grandfather      leukemia     Colon Cancer Paternal Grandmother      Other Cancer Paternal Grandfather      Coronary Artery Disease No family hx of      Hypertension No family hx of      Hyperlipidemia No family hx of      Breast Cancer No family hx of      Prostate Cancer No family hx of      Depression No family hx of      Anxiety Disorder No family hx of      MENTAL ILLNESS No family hx of      Substance Abuse No family hx of      Anesthesia Reaction No family hx of      Asthma No family hx of      OSTEOPOROSIS No family hx of      Genetic Disorder No family hx of      Thyroid Disease No family hx of      Obesity No family hx of      Unknown/Adopted No family hx of             Allergies:   No Known Allergies         Medications:     Current Outpatient Prescriptions   Medication Sig Dispense Refill     GIANVI 3-0.02 MG per tablet Take 1 tablet by mouth daily  12     XOPENEX HFA 45 MCG/ACT Inhaler Inhale 1 puff into the lungs as needed  1            Physical Exam:   Blood pressure 110/66, pulse 55, temperature 97.3  F (36.3  C), height 1.619 m (5' 3.75\"), weight 59.4 kg (131 lb), not currently breastfeeding.  Wt Readings from Last 4 Encounters:   10/23/17 59.4 kg (131 lb) "   10/12/17 59.6 kg (131 lb 4.8 oz)       Constitutional: well-developed, appearing stated age; cooperative  Eyes: nl EOM, PERRLA, vision, conjunctiva, sclera  ENT: nl external ears, nose, hearing, lips, teeth, gums, throat  No mucous membrane lesions, normal saliva pool  Neck: no mass or thyroid enlargement  Resp: lungs clear to auscultation, nl to palpation  CV: RRR, no murmurs, rubs or gallops, no edema  GI: no ABD mass or tenderness, no HSM  : not tested  Lymph: no cervical, supraclavicular, inguinal or epitrochlear nodes    MS: All TMJ, neck, shoulder, elbow, wrist, MCP/PIP/DIP, spine, hip, knee, ankle, and foot MTP/IP joints were examined.   -- No active synovitis or deformity. Full ROM.  Normal  strength.   -- No dactylitis,  tenosynovitis, enthesopathy.    Skin: no nail pitting, alopecia, rash, nodules or lesions  Neuro: nl cranial nerves, strength, sensation, DTRs.   Psych: nl judgement, orientation, memory, affect.         Data:     Results for orders placed or performed in visit on 10/12/17   TSH   Result Value Ref Range    TSH 3.66 0.40 - 4.00 mU/L   T4 free   Result Value Ref Range    T4 Free 1.13 0.76 - 1.46 ng/dL   Thyroid peroxidase antibody   Result Value Ref Range    Thyroid Peroxidase Antibody <10 <35 IU/mL   Rheumatoid factor   Result Value Ref Range    Rheumatoid Factor <20 <20 IU/mL   Anti Nuclear Margo IgG by IFA with Reflex   Result Value Ref Range    CHI interpretation Borderline Positive (A) NEG^Negative    CHI pattern 1 SPECKLED     CHI titer 1 1:80    CK total   Result Value Ref Range    CK Total 130 30 - 225 U/L   CRP inflammation   Result Value Ref Range    CRP Inflammation 4.2 0.0 - 8.0 mg/L   Erythrocyte sedimentation rate auto   Result Value Ref Range    Sed Rate 6 0 - 20 mm/h   Comprehensive metabolic panel   Result Value Ref Range    Sodium 137 133 - 144 mmol/L    Potassium 3.8 3.4 - 5.3 mmol/L    Chloride 106 94 - 109 mmol/L    Carbon Dioxide 27 20 - 32 mmol/L    Anion Gap 4 3  - 14 mmol/L    Glucose 95 70 - 99 mg/dL    Urea Nitrogen 10 7 - 30 mg/dL    Creatinine 0.89 0.52 - 1.04 mg/dL    GFR Estimate 76 >60 mL/min/1.7m2    GFR Estimate If Black >90 >60 mL/min/1.7m2    Calcium 9.2 8.5 - 10.1 mg/dL    Bilirubin Total 0.5 0.2 - 1.3 mg/dL    Albumin 3.7 3.4 - 5.0 g/dL    Protein Total 7.4 6.8 - 8.8 g/dL    Alkaline Phosphatase 47 40 - 150 U/L    ALT 27 0 - 50 U/L    AST 23 0 - 45 U/L       Recent Labs   Lab Test  10/12/17   1230   ALBUMIN  3.7   CRP  4.2   BUN  10      Recent Labs   Lab Test  10/12/17   1230   TSH  3.66   T4  1.13     Urea Nitrogen   Date Value Ref Range Status   10/12/2017 10 7 - 30 mg/dL Final     Sed Rate   Date Value Ref Range Status   10/12/2017 6 0 - 20 mm/h Final     CRP Inflammation   Date Value Ref Range Status   10/12/2017 4.2 0.0 - 8.0 mg/L Final     AST   Date Value Ref Range Status   10/12/2017 23 0 - 45 U/L Final     Albumin   Date Value Ref Range Status   10/12/2017 3.7 3.4 - 5.0 g/dL Final     Alkaline Phosphatase   Date Value Ref Range Status   10/12/2017 47 40 - 150 U/L Final     ALT   Date Value Ref Range Status   10/12/2017 27 0 - 50 U/L Final     Rheumatoid Factor   Date Value Ref Range Status   10/12/2017 <20 <20 IU/mL Final     Recent Labs   Lab Test  10/12/17   1230   BUN  10   TSH  3.66   AST  23   ALT  27   ALKPHOS  47       Outside studies reviewed: Notes from [facility] reviewed     Reviewed Rheumatology lab flowsheet's     Sonja Martino MD  [facility]   Pager - [phone]

## 2017-10-23 NOTE — PATIENT INSTRUCTIONS
-- Will do blood tests     -- Connective tissue disease seems very unlikely.     -- RTC on as needed basis

## 2017-10-24 LAB
C3 SERPL-MCNC: 76 MG/DL (ref 76–169)
C4 SERPL-MCNC: 15 MG/DL (ref 15–50)
CCP AB SER IA-ACNC: 1 U/ML
CENTROMERE IGG SER-ACNC: <0.2 AI (ref 0–0.9)
DSDNA AB SER-ACNC: <1 IU/ML
ENA RNP IGG SER IA-ACNC: 0.2 AI (ref 0–0.9)
ENA SCL70 IGG SER IA-ACNC: <0.2 AI (ref 0–0.9)
ENA SM IGG SER-ACNC: <0.2 AI (ref 0–0.9)
ENA SS-A IGG SER IA-ACNC: <0.2 AI (ref 0–0.9)
ENA SS-B IGG SER IA-ACNC: <0.2 AI (ref 0–0.9)

## 2018-01-28 ENCOUNTER — HEALTH MAINTENANCE LETTER (OUTPATIENT)
Age: 28
End: 2018-01-28

## 2020-03-11 ENCOUNTER — HEALTH MAINTENANCE LETTER (OUTPATIENT)
Age: 30
End: 2020-03-11

## 2020-12-27 ENCOUNTER — HEALTH MAINTENANCE LETTER (OUTPATIENT)
Age: 30
End: 2020-12-27

## 2021-04-25 ENCOUNTER — HEALTH MAINTENANCE LETTER (OUTPATIENT)
Age: 31
End: 2021-04-25

## 2021-10-09 ENCOUNTER — HEALTH MAINTENANCE LETTER (OUTPATIENT)
Age: 31
End: 2021-10-09

## 2022-05-21 ENCOUNTER — HEALTH MAINTENANCE LETTER (OUTPATIENT)
Age: 32
End: 2022-05-21

## 2022-09-17 ENCOUNTER — HEALTH MAINTENANCE LETTER (OUTPATIENT)
Age: 32
End: 2022-09-17

## 2022-11-15 ENCOUNTER — LAB REQUISITION (OUTPATIENT)
Dept: LAB | Facility: CLINIC | Age: 32
End: 2022-11-15

## 2022-11-15 DIAGNOSIS — R87.810 CERVICAL HIGH RISK HUMAN PAPILLOMAVIRUS (HPV) DNA TEST POSITIVE: ICD-10-CM

## 2022-11-15 DIAGNOSIS — E02 SUBCLINICAL IODINE-DEFICIENCY HYPOTHYROIDISM: ICD-10-CM

## 2022-11-15 LAB
T4 FREE SERPL-MCNC: 1.05 NG/DL (ref 0.9–1.7)
TSH SERPL DL<=0.005 MIU/L-ACNC: 5.71 UIU/ML (ref 0.3–4.2)

## 2022-11-15 PROCEDURE — 84443 ASSAY THYROID STIM HORMONE: CPT | Performed by: OBSTETRICS & GYNECOLOGY

## 2022-11-15 PROCEDURE — 87624 HPV HI-RISK TYP POOLED RSLT: CPT | Performed by: OBSTETRICS & GYNECOLOGY

## 2022-11-15 PROCEDURE — 86376 MICROSOMAL ANTIBODY EACH: CPT | Performed by: OBSTETRICS & GYNECOLOGY

## 2022-11-15 PROCEDURE — 84480 ASSAY TRIIODOTHYRONINE (T3): CPT | Performed by: OBSTETRICS & GYNECOLOGY

## 2022-11-15 PROCEDURE — G0145 SCR C/V CYTO,THINLAYER,RESCR: HCPCS | Performed by: OBSTETRICS & GYNECOLOGY

## 2022-11-15 PROCEDURE — 84439 ASSAY OF FREE THYROXINE: CPT | Performed by: OBSTETRICS & GYNECOLOGY

## 2022-11-16 LAB
T3 SERPL-MCNC: 149 NG/DL (ref 85–202)
THYROPEROXIDASE AB SERPL-ACNC: <10 IU/ML

## 2022-11-17 LAB
BKR LAB AP GYN ADEQUACY: NORMAL
BKR LAB AP GYN INTERPRETATION: NORMAL
BKR LAB AP HPV REFLEX: NORMAL
BKR LAB AP LMP: NORMAL
BKR LAB AP PREVIOUS ABNL DX: NORMAL
BKR LAB AP PREVIOUS ABNORMAL: NORMAL
PATH REPORT.COMMENTS IMP SPEC: NORMAL
PATH REPORT.COMMENTS IMP SPEC: NORMAL
PATH REPORT.RELEVANT HX SPEC: NORMAL

## 2022-11-21 LAB
HUMAN PAPILLOMA VIRUS 16 DNA: NEGATIVE
HUMAN PAPILLOMA VIRUS 18 DNA: NEGATIVE
HUMAN PAPILLOMA VIRUS FINAL DIAGNOSIS: ABNORMAL
HUMAN PAPILLOMA VIRUS OTHER HR: POSITIVE

## 2022-12-13 ENCOUNTER — LAB REQUISITION (OUTPATIENT)
Dept: LAB | Facility: CLINIC | Age: 32
End: 2022-12-13

## 2022-12-13 DIAGNOSIS — B97.7 PAPILLOMAVIRUS AS THE CAUSE OF DISEASES CLASSIFIED ELSEWHERE: ICD-10-CM

## 2022-12-13 PROCEDURE — 88305 TISSUE EXAM BY PATHOLOGIST: CPT | Performed by: PATHOLOGY

## 2022-12-19 LAB
PATH REPORT.COMMENTS IMP SPEC: NORMAL
PATH REPORT.COMMENTS IMP SPEC: NORMAL
PATH REPORT.FINAL DX SPEC: NORMAL
PATH REPORT.GROSS SPEC: NORMAL
PATH REPORT.MICROSCOPIC SPEC OTHER STN: NORMAL
PATH REPORT.MICROSCOPIC SPEC OTHER STN: NORMAL
PATH REPORT.RELEVANT HX SPEC: NORMAL
PHOTO IMAGE: NORMAL

## 2023-10-16 ENCOUNTER — PATIENT OUTREACH (OUTPATIENT)
Dept: CARE COORDINATION | Facility: CLINIC | Age: 33
End: 2023-10-16
Payer: COMMERCIAL

## 2023-12-16 ENCOUNTER — HEALTH MAINTENANCE LETTER (OUTPATIENT)
Age: 33
End: 2023-12-16

## 2024-01-11 ENCOUNTER — LAB REQUISITION (OUTPATIENT)
Dept: LAB | Facility: CLINIC | Age: 34
End: 2024-01-11

## 2024-01-11 DIAGNOSIS — Z12.4 ENCOUNTER FOR SCREENING FOR MALIGNANT NEOPLASM OF CERVIX: ICD-10-CM

## 2024-01-11 PROCEDURE — 87624 HPV HI-RISK TYP POOLED RSLT: CPT | Performed by: NURSE PRACTITIONER

## 2024-01-11 PROCEDURE — G0145 SCR C/V CYTO,THINLAYER,RESCR: HCPCS | Performed by: NURSE PRACTITIONER

## 2025-02-10 ENCOUNTER — LAB REQUISITION (OUTPATIENT)
Dept: LAB | Facility: CLINIC | Age: 35
End: 2025-02-10

## 2025-02-10 DIAGNOSIS — Z12.4 ENCOUNTER FOR SCREENING FOR MALIGNANT NEOPLASM OF CERVIX: ICD-10-CM

## 2025-02-10 DIAGNOSIS — E03.9 HYPOTHYROIDISM, UNSPECIFIED: ICD-10-CM

## 2025-02-10 DIAGNOSIS — Z36.9 ENCOUNTER FOR ANTENATAL SCREENING, UNSPECIFIED: ICD-10-CM

## 2025-02-10 LAB
BASOPHILS # BLD AUTO: 0 10E3/UL (ref 0–0.2)
BASOPHILS NFR BLD AUTO: 0 %
EOSINOPHIL # BLD AUTO: 0.1 10E3/UL (ref 0–0.7)
EOSINOPHIL NFR BLD AUTO: 1 %
ERYTHROCYTE [DISTWIDTH] IN BLOOD BY AUTOMATED COUNT: 12.4 % (ref 10–15)
EST. AVERAGE GLUCOSE BLD GHB EST-MCNC: 105 MG/DL
HBA1C MFR BLD: 5.3 %
HBV SURFACE AG SERPL QL IA: NONREACTIVE
HCT VFR BLD AUTO: 39 % (ref 35–47)
HCV AB SERPL QL IA: NONREACTIVE
HGB BLD-MCNC: 13.4 G/DL (ref 11.7–15.7)
HIV 1+2 AB+HIV1 P24 AG SERPL QL IA: NONREACTIVE
HOLD SPECIMEN: NORMAL
IMM GRANULOCYTES # BLD: 0 10E3/UL
IMM GRANULOCYTES NFR BLD: 0 %
LYMPHOCYTES # BLD AUTO: 2.3 10E3/UL (ref 0.8–5.3)
LYMPHOCYTES NFR BLD AUTO: 25 %
MCH RBC QN AUTO: 30.9 PG (ref 26.5–33)
MCHC RBC AUTO-ENTMCNC: 34.4 G/DL (ref 31.5–36.5)
MCV RBC AUTO: 90 FL (ref 78–100)
MONOCYTES # BLD AUTO: 0.5 10E3/UL (ref 0–1.3)
MONOCYTES NFR BLD AUTO: 5 %
NEUTROPHILS # BLD AUTO: 6.5 10E3/UL (ref 1.6–8.3)
NEUTROPHILS NFR BLD AUTO: 69 %
NRBC # BLD AUTO: 0 10E3/UL
NRBC BLD AUTO-RTO: 0 /100
PLATELET # BLD AUTO: 252 10E3/UL (ref 150–450)
RBC # BLD AUTO: 4.34 10E6/UL (ref 3.8–5.2)
RUBV IGG SERPL QL IA: 5.13 INDEX
RUBV IGG SERPL QL IA: POSITIVE
T PALLIDUM AB SER QL: NONREACTIVE
T4 FREE SERPL-MCNC: 1.5 NG/DL (ref 0.9–1.7)
TSH SERPL DL<=0.005 MIU/L-ACNC: 0.9 UIU/ML (ref 0.3–4.2)
WBC # BLD AUTO: 9.5 10E3/UL (ref 4–11)

## 2025-02-10 PROCEDURE — G0145 SCR C/V CYTO,THINLAYER,RESCR: HCPCS | Performed by: REGISTERED NURSE

## 2025-02-10 PROCEDURE — 87389 HIV-1 AG W/HIV-1&-2 AB AG IA: CPT | Performed by: REGISTERED NURSE

## 2025-02-10 PROCEDURE — 86900 BLOOD TYPING SEROLOGIC ABO: CPT | Performed by: REGISTERED NURSE

## 2025-02-10 PROCEDURE — 87086 URINE CULTURE/COLONY COUNT: CPT | Performed by: REGISTERED NURSE

## 2025-02-10 PROCEDURE — 84443 ASSAY THYROID STIM HORMONE: CPT | Performed by: REGISTERED NURSE

## 2025-02-10 PROCEDURE — 87624 HPV HI-RISK TYP POOLED RSLT: CPT | Performed by: REGISTERED NURSE

## 2025-02-10 PROCEDURE — 86780 TREPONEMA PALLIDUM: CPT | Performed by: REGISTERED NURSE

## 2025-02-10 PROCEDURE — 85004 AUTOMATED DIFF WBC COUNT: CPT | Performed by: REGISTERED NURSE

## 2025-02-10 PROCEDURE — 86803 HEPATITIS C AB TEST: CPT | Performed by: REGISTERED NURSE

## 2025-02-10 PROCEDURE — 86762 RUBELLA ANTIBODY: CPT | Performed by: REGISTERED NURSE

## 2025-02-10 PROCEDURE — 87340 HEPATITIS B SURFACE AG IA: CPT | Performed by: REGISTERED NURSE

## 2025-02-10 PROCEDURE — 85048 AUTOMATED LEUKOCYTE COUNT: CPT | Performed by: REGISTERED NURSE

## 2025-02-10 PROCEDURE — 83036 HEMOGLOBIN GLYCOSYLATED A1C: CPT | Performed by: REGISTERED NURSE

## 2025-02-10 PROCEDURE — 84439 ASSAY OF FREE THYROXINE: CPT | Performed by: REGISTERED NURSE

## 2025-02-11 LAB
ABO + RH BLD: NORMAL
BACTERIA UR CULT: NORMAL
BLD GP AB SCN SERPL QL: NEGATIVE
SPECIMEN EXP DATE BLD: NORMAL

## 2025-02-12 LAB
HPV HR 12 DNA CVX QL NAA+PROBE: POSITIVE
HPV16 DNA CVX QL NAA+PROBE: NEGATIVE
HPV18 DNA CVX QL NAA+PROBE: NEGATIVE
HUMAN PAPILLOMA VIRUS FINAL DIAGNOSIS: ABNORMAL

## 2025-02-13 LAB
BKR AP ASSOCIATED HPV REPORT: NORMAL
BKR LAB AP GYN ADEQUACY: NORMAL
BKR LAB AP GYN INTERPRETATION: NORMAL
BKR LAB AP LMP: NORMAL
BKR LAB AP PREVIOUS ABNL DX: NORMAL
BKR LAB AP PREVIOUS ABNORMAL: NORMAL
PATH REPORT.COMMENTS IMP SPEC: NORMAL
PATH REPORT.COMMENTS IMP SPEC: NORMAL
PATH REPORT.RELEVANT HX SPEC: NORMAL

## 2025-05-30 ENCOUNTER — LAB REQUISITION (OUTPATIENT)
Dept: LAB | Facility: CLINIC | Age: 35
End: 2025-05-30

## 2025-05-30 DIAGNOSIS — Z36.89 ENCOUNTER FOR OTHER SPECIFIED ANTENATAL SCREENING: ICD-10-CM

## 2025-05-30 PROCEDURE — 86780 TREPONEMA PALLIDUM: CPT

## 2025-05-31 LAB — T PALLIDUM AB SER QL: NONREACTIVE

## 2025-07-25 ENCOUNTER — LAB REQUISITION (OUTPATIENT)
Dept: LAB | Facility: CLINIC | Age: 35
End: 2025-07-25
Payer: COMMERCIAL

## 2025-07-25 DIAGNOSIS — Z36.85 ENCOUNTER FOR ANTENATAL SCREENING FOR STREPTOCOCCUS B: ICD-10-CM

## 2025-07-25 DIAGNOSIS — Z36.89 ENCOUNTER FOR OTHER SPECIFIED ANTENATAL SCREENING: ICD-10-CM

## 2025-07-25 PROCEDURE — 87653 STREP B DNA AMP PROBE: CPT | Mod: ORL

## 2025-07-26 LAB — GP B STREP DNA SPEC QL NAA+PROBE: NEGATIVE
